# Patient Record
Sex: MALE | Race: WHITE | NOT HISPANIC OR LATINO | Employment: FULL TIME | URBAN - METROPOLITAN AREA
[De-identification: names, ages, dates, MRNs, and addresses within clinical notes are randomized per-mention and may not be internally consistent; named-entity substitution may affect disease eponyms.]

---

## 2021-07-14 PROCEDURE — 96372 THER/PROPH/DIAG INJ SC/IM: CPT

## 2021-07-14 PROCEDURE — 99284 EMERGENCY DEPT VISIT MOD MDM: CPT

## 2021-07-15 ENCOUNTER — HOSPITAL ENCOUNTER (EMERGENCY)
Facility: MEDICAL CENTER | Age: 31
End: 2021-07-15
Attending: EMERGENCY MEDICINE
Payer: COMMERCIAL

## 2021-07-15 ENCOUNTER — APPOINTMENT (OUTPATIENT)
Dept: RADIOLOGY | Facility: MEDICAL CENTER | Age: 31
End: 2021-07-15
Attending: EMERGENCY MEDICINE
Payer: COMMERCIAL

## 2021-07-15 VITALS
DIASTOLIC BLOOD PRESSURE: 61 MMHG | RESPIRATION RATE: 14 BRPM | TEMPERATURE: 98.5 F | SYSTOLIC BLOOD PRESSURE: 124 MMHG | HEART RATE: 50 BPM | OXYGEN SATURATION: 97 %

## 2021-07-15 DIAGNOSIS — S16.1XXA STRAIN OF NECK MUSCLE, INITIAL ENCOUNTER: ICD-10-CM
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
## 2021-07-15 PROCEDURE — 72125 CT NECK SPINE W/O DYE: CPT

## 2021-07-15 PROCEDURE — 700102 HCHG RX REV CODE 250 W/ 637 OVERRIDE(OP): Performed by: EMERGENCY MEDICINE

## 2021-07-15 PROCEDURE — 700111 HCHG RX REV CODE 636 W/ 250 OVERRIDE (IP): Performed by: EMERGENCY MEDICINE

## 2021-07-15 PROCEDURE — 72040 X-RAY EXAM NECK SPINE 2-3 VW: CPT

## 2021-07-15 PROCEDURE — A9270 NON-COVERED ITEM OR SERVICE: HCPCS | Performed by: EMERGENCY MEDICINE

## 2021-07-15 RX ORDER — DIAZEPAM 5 MG/1
5 TABLET ORAL ONCE
Status: COMPLETED | OUTPATIENT
Start: 2021-07-15 | End: 2021-07-15

## 2021-07-15 RX ORDER — CYCLOBENZAPRINE HCL 10 MG
10 TABLET ORAL 3 TIMES DAILY PRN
Qty: 30 TABLET | Refills: 0 | Status: SHIPPED | OUTPATIENT
Start: 2021-07-15 | End: 2021-09-09

## 2021-07-15 RX ORDER — KETOROLAC TROMETHAMINE 30 MG/ML
30 INJECTION, SOLUTION INTRAMUSCULAR; INTRAVENOUS ONCE
Status: COMPLETED | OUTPATIENT
Start: 2021-07-15 | End: 2021-07-15

## 2021-07-15 RX ORDER — NAPROXEN 500 MG/1
500 TABLET ORAL 2 TIMES DAILY WITH MEALS
Qty: 60 TABLET | Refills: 0 | Status: SHIPPED | OUTPATIENT
Start: 2021-07-15 | End: 2021-09-09

## 2021-07-15 RX ADMIN — DIAZEPAM 5 MG: 5 TABLET ORAL at 01:51

## 2021-07-15 RX ADMIN — KETOROLAC TROMETHAMINE 30 MG: 30 INJECTION, SOLUTION INTRAMUSCULAR at 01:51

## 2021-07-15 ASSESSMENT — ENCOUNTER SYMPTOMS
HEADACHES: 1
NECK PAIN: 1
BACK PAIN: 1
VOMITING: 1
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
## 2021-07-15 NOTE — ED NOTES
Pt discharged home. Explained discharge and medication instructions. Questions and comments addressed. Pt verbalized understanding of instructions. Pt advised to follow-up with PCP or return to ED for any new or worsening of symptoms. Pt is ambulating well and steady on feet. VS stable. Pt's SO at bedside and will be driving pt home.

## 2021-09-09 ENCOUNTER — HOSPITAL ENCOUNTER (OUTPATIENT)
Dept: RADIOLOGY | Facility: MEDICAL CENTER | Age: 31
End: 2021-09-09
Attending: NURSE PRACTITIONER
Payer: COMMERCIAL

## 2021-09-09 ENCOUNTER — OFFICE VISIT (OUTPATIENT)
Dept: URGENT CARE | Facility: PHYSICIAN GROUP | Age: 31
End: 2021-09-09
Payer: COMMERCIAL

## 2021-09-09 VITALS
BODY MASS INDEX: 33.79 KG/M2 | WEIGHT: 236 LBS | OXYGEN SATURATION: 96 % | SYSTOLIC BLOOD PRESSURE: 108 MMHG | TEMPERATURE: 98.4 F | DIASTOLIC BLOOD PRESSURE: 64 MMHG | HEIGHT: 70 IN | RESPIRATION RATE: 17 BRPM | HEART RATE: 88 BPM

## 2021-09-09 DIAGNOSIS — S93.601A SPRAIN OF RIGHT FOOT, INITIAL ENCOUNTER: ICD-10-CM

## 2021-09-09 DIAGNOSIS — M79.671 RIGHT FOOT PAIN: ICD-10-CM

## 2021-09-09 PROCEDURE — 99203 OFFICE O/P NEW LOW 30 MIN: CPT | Performed by: NURSE PRACTITIONER

## 2021-09-09 PROCEDURE — 73630 X-RAY EXAM OF FOOT: CPT | Mod: RT

## 2021-09-09 ASSESSMENT — ENCOUNTER SYMPTOMS
CHILLS: 0
MYALGIAS: 1
FEVER: 0
FOCAL WEAKNESS: 0
SENSORY CHANGE: 0

## 2021-09-09 NOTE — PROGRESS NOTES
Subjective     Nick Kunz is a 30 y.o. male who presents with Foot Injury (R foot fell from trying to jump a wall , can't put pressure on right leg bc of the pain)            HPI   New problem.  Patient is a 30-year-old male who presents with right foot injury after a fall trying to jump a wall.  He has pain to the medial aspect just inferior to the medial malleolus.  He cannot bear weight on it without significant pain.  He denies distal paresthesia or focal weakness.  He has been icing and wearing a foot brace since the injury however no improvement in symptoms.  Patient has no known allergies.  No current outpatient medications on file prior to visit.     No current facility-administered medications on file prior to visit.     Social History     Socioeconomic History   • Marital status: Single     Spouse name: Not on file   • Number of children: Not on file   • Years of education: Not on file   • Highest education level: Not on file   Occupational History   • Not on file   Tobacco Use   • Smoking status: Never Smoker   • Smokeless tobacco: Never Used   Substance and Sexual Activity   • Alcohol use: No   • Drug use: No   • Sexual activity: Not on file   Other Topics Concern   • Not on file   Social History Narrative   • Not on file     Social Determinants of Health     Financial Resource Strain:    • Difficulty of Paying Living Expenses:    Food Insecurity:    • Worried About Running Out of Food in the Last Year:    • Ran Out of Food in the Last Year:    Transportation Needs:    • Lack of Transportation (Medical):    • Lack of Transportation (Non-Medical):    Physical Activity:    • Days of Exercise per Week:    • Minutes of Exercise per Session:    Stress:    • Feeling of Stress :    Social Connections:    • Frequency of Communication with Friends and Family:    • Frequency of Social Gatherings with Friends and Family:    • Attends Voodoo Services:    • Active Member of Clubs or Organizations:    • Attends  "Club or Organization Meetings:    • Marital Status:    Intimate Partner Violence:    • Fear of Current or Ex-Partner:    • Emotionally Abused:    • Physically Abused:    • Sexually Abused:      Breast Cancer-related family history is not on file.      Review of Systems   Constitutional: Negative for chills and fever.   Musculoskeletal: Positive for joint pain and myalgias.   Neurological: Negative for sensory change and focal weakness.              Objective     /64 (BP Location: Left arm, Patient Position: Sitting, BP Cuff Size: Large adult)   Pulse 88   Temp 36.9 °C (98.4 °F) (Temporal)   Resp 17   Ht 1.778 m (5' 10\")   Wt 107 kg (236 lb)   SpO2 96%   BMI 33.86 kg/m²      Physical Exam  Vitals and nursing note reviewed.   Constitutional:       Appearance: Normal appearance.   Musculoskeletal:      Right foot: Decreased range of motion. Normal capillary refill. Tenderness and bony tenderness present. No swelling.   Skin:     General: Skin is warm and dry.      Coloration: Skin is not pale.      Findings: No bruising or erythema.   Neurological:      General: No focal deficit present.      Mental Status: He is alert and oriented to person, place, and time.   Psychiatric:         Mood and Affect: Mood normal.                             Assessment & Plan        1. Sprain of right foot, initial encounter     2. Right foot pain  DX-FOOT-COMPLETE 3+ RIGHT     X-ray negative for fracture or dislocation.  Patient is advised on the rice protocol which she has been following.  I have given him a work note for the next 2 days and he has the weekend off to rest and recover.  Follow-up 7 to 10 days if symptoms or not improving.           "

## 2021-10-28 ENCOUNTER — HOSPITAL ENCOUNTER (OUTPATIENT)
Facility: MEDICAL CENTER | Age: 31
End: 2021-10-28
Attending: PHYSICIAN ASSISTANT
Payer: COMMERCIAL

## 2021-10-28 ENCOUNTER — OFFICE VISIT (OUTPATIENT)
Dept: URGENT CARE | Facility: PHYSICIAN GROUP | Age: 31
End: 2021-10-28
Payer: COMMERCIAL

## 2021-10-28 VITALS
HEIGHT: 70 IN | OXYGEN SATURATION: 99 % | BODY MASS INDEX: 32.93 KG/M2 | DIASTOLIC BLOOD PRESSURE: 84 MMHG | RESPIRATION RATE: 16 BRPM | SYSTOLIC BLOOD PRESSURE: 140 MMHG | HEART RATE: 72 BPM | TEMPERATURE: 99.7 F | WEIGHT: 230 LBS

## 2021-10-28 DIAGNOSIS — R11.2 NON-INTRACTABLE VOMITING WITH NAUSEA, UNSPECIFIED VOMITING TYPE: ICD-10-CM

## 2021-10-28 DIAGNOSIS — R05.9 COUGH: ICD-10-CM

## 2021-10-28 LAB
EXTERNAL QUALITY CONTROL: NORMAL
SARS-COV+SARS-COV-2 AG RESP QL IA.RAPID: NEGATIVE

## 2021-10-28 PROCEDURE — 87426 SARSCOV CORONAVIRUS AG IA: CPT | Performed by: PHYSICIAN ASSISTANT

## 2021-10-28 PROCEDURE — U0003 INFECTIOUS AGENT DETECTION BY NUCLEIC ACID (DNA OR RNA); SEVERE ACUTE RESPIRATORY SYNDROME CORONAVIRUS 2 (SARS-COV-2) (CORONAVIRUS DISEASE [COVID-19]), AMPLIFIED PROBE TECHNIQUE, MAKING USE OF HIGH THROUGHPUT TECHNOLOGIES AS DESCRIBED BY CMS-2020-01-R: HCPCS

## 2021-10-28 PROCEDURE — 99214 OFFICE O/P EST MOD 30 MIN: CPT | Performed by: PHYSICIAN ASSISTANT

## 2021-10-28 PROCEDURE — U0005 INFEC AGEN DETEC AMPLI PROBE: HCPCS

## 2021-10-28 ASSESSMENT — ENCOUNTER SYMPTOMS
NAUSEA: 1
COUGH: 1
VOMITING: 1
DIARRHEA: 1
CHILLS: 0
FEVER: 0

## 2021-10-28 NOTE — LETTER
October 28, 2021         Patient: Nikc Kunz   YOB: 1990   Date of Visit: 10/28/2021           To Whom it May Concern:    Nick Kunz was seen in my clinic on 10/28/2021.  Please excuse patient's recent absence due to illness which started earlier this week.  He should be excused from work until his Covid results come back.    If you have any questions or concerns, please don't hesitate to call.        Sincerely,           Tavo Kunz P.A.-C.  Electronically Signed

## 2021-10-28 NOTE — PROGRESS NOTES
"  Subjective:   Nick Kunz is a 30 y.o. male who presents today with   Chief Complaint   Patient presents with   • Other     2nd covid vaccine on monday, been sick since     Other  This is a new problem. Associated symptoms include coughing, nausea and vomiting. Pertinent negatives include no chills or fever. He has tried nothing for the symptoms. The treatment provided no relief.     Patient had his first dose of Pfizer vaccine on October 2 and his second dose 3 days ago on October 25.  Patient states approximately 24 hours after his vaccination on Monday he was having some cough and nausea and vomiting.  Patient states he is still able to eat and drink.  PMH:  has no past medical history on file.  MEDS: No current outpatient medications on file.  ALLERGIES: No Known Allergies  SURGHX:   Past Surgical History:   Procedure Laterality Date   • OTHER ORTHOPEDIC SURGERY       SOCHX:  reports that he has never smoked. He has never used smokeless tobacco. He reports that he does not drink alcohol and does not use drugs.  FH: Reviewed with patient, not pertinent to this visit.     Review of Systems   Constitutional: Negative for chills and fever.   Respiratory: Positive for cough.    Gastrointestinal: Positive for diarrhea (Mild), nausea and vomiting.      Objective:   /84   Pulse 72   Temp 37.6 °C (99.7 °F)   Resp 16   Ht 1.778 m (5' 10\")   Wt 104 kg (230 lb)   SpO2 99%   BMI 33.00 kg/m²   Physical Exam  Vitals and nursing note reviewed.   Constitutional:       General: He is not in acute distress.     Appearance: Normal appearance. He is well-developed. He is not ill-appearing or toxic-appearing.   HENT:      Head: Normocephalic and atraumatic.      Right Ear: Hearing normal.      Left Ear: Hearing normal.      Mouth/Throat:      Pharynx: No oropharyngeal exudate or posterior oropharyngeal erythema.   Eyes:      Conjunctiva/sclera: Conjunctivae normal.   Cardiovascular:      Rate and Rhythm: Normal rate " and regular rhythm.      Heart sounds: Normal heart sounds.   Pulmonary:      Effort: Pulmonary effort is normal.      Breath sounds: Normal breath sounds. No stridor. No wheezing, rhonchi or rales.   Abdominal:      General: Bowel sounds are normal. There is no distension.      Tenderness: There is no abdominal tenderness. There is no guarding.   Musculoskeletal:      Comments: Normal movement in all 4 extremities   Skin:     General: Skin is warm and dry.   Neurological:      Mental Status: He is alert.      Coordination: Coordination normal.   Psychiatric:         Mood and Affect: Mood normal.     COVID POCT NEG  Assessment/Plan:   Assessment    1. Cough  - POCT SARS-COV Antigen FRANCOIS (Symptomatic Only)  - SARS-CoV-2 PCR (24 hour In-House): Collect NP swab in VTM; Future    2. Non-intractable vomiting with nausea, unspecified vomiting type  - POCT SARS-COV Antigen FRANCOIS (Symptomatic Only)  - SARS-CoV-2 PCR (24 hour In-House): Collect NP swab in VTM; Future  Symptoms are most consistent with viral illness.  Believe this may have been developing prior to him having the vaccine.  Would not suspect Covid vaccine related symptoms to go on for 3 days.  We will rule out Covid at this time.  No signs of angioedema or reaction on exam today.  Discussed CDC guidelines including self isolation at home.   Patient encouraged to get plenty of rest, use OTC tylenol for pain/fever, and drink plenty of fluids.    Differential diagnosis, natural history, supportive care, and indications for immediate follow-up discussed.   Patient given instructions and understanding of medications and treatment.    If not improving in 3-5 days, F/U with PCP or return to  if symptoms worsen.    Patient agreeable to plan.  Greater than 30 minutes were spent reviewing patient's chart, examining and obtaining history from patient, and discussing plan of care.       Please note that this dictation was created using voice recognition software. I have  made every reasonable attempt to correct obvious errors, but I expect that there are errors of grammar and possibly content that I did not discover before finalizing the note.    Tavo Kunz PA-C

## 2021-10-29 DIAGNOSIS — R05.9 COUGH: ICD-10-CM

## 2021-10-29 DIAGNOSIS — R11.2 NON-INTRACTABLE VOMITING WITH NAUSEA, UNSPECIFIED VOMITING TYPE: ICD-10-CM

## 2021-10-29 LAB
COVID ORDER STATUS COVID19: NORMAL
SARS-COV-2 RNA RESP QL NAA+PROBE: NOTDETECTED
SPECIMEN SOURCE: NORMAL

## 2021-11-02 ENCOUNTER — TELEPHONE (OUTPATIENT)
Dept: URGENT CARE | Facility: PHYSICIAN GROUP | Age: 31
End: 2021-11-02

## 2021-11-02 NOTE — TELEPHONE ENCOUNTER
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

## 2022-02-12 ENCOUNTER — OCCUPATIONAL MEDICINE (OUTPATIENT)
Dept: URGENT CARE | Facility: CLINIC | Age: 32
End: 2022-02-12
Payer: COMMERCIAL

## 2022-02-12 VITALS
WEIGHT: 244 LBS | RESPIRATION RATE: 16 BRPM | HEART RATE: 80 BPM | DIASTOLIC BLOOD PRESSURE: 70 MMHG | HEIGHT: 70 IN | SYSTOLIC BLOOD PRESSURE: 116 MMHG | BODY MASS INDEX: 34.93 KG/M2 | OXYGEN SATURATION: 96 % | TEMPERATURE: 97.3 F

## 2022-02-12 DIAGNOSIS — L03.011 PARONYCHIA OF FINGER OF RIGHT HAND: ICD-10-CM

## 2022-02-12 PROCEDURE — 99213 OFFICE O/P EST LOW 20 MIN: CPT | Performed by: FAMILY MEDICINE

## 2022-02-12 RX ORDER — SULFAMETHOXAZOLE AND TRIMETHOPRIM 800; 160 MG/1; MG/1
1 TABLET ORAL 2 TIMES DAILY
Qty: 14 TABLET | Refills: 0 | Status: SHIPPED | OUTPATIENT
Start: 2022-02-12 | End: 2022-02-19

## 2022-02-12 NOTE — LETTER
[facility] [address] [place] [address]  Phone:  [phone] - Fax:  [phone]   [facility] Progress Report and Disability Certification  Date of Service: 2/12/2022   No Show:  No  Date / Time of Next Visit:  2/17/22 4:00 PM   Claim Information   Patient Name: Nick Kunz  Claim Number:     Employer:   [employer] Date of Injury: 2/7/2022     Insurer / TPA: [employer]  ID / SSN:     Occupation:   Diagnosis: The encounter diagnosis was Paronychia of finger of right hand.    Medical Information   Related to Industrial Injury? Yes    Subjective Complaints:  DOI:  2/7    Pt sustained small abrasion to rt index finger at work in course of pulling down box.       Pt c/o constant, throbbing pain at  Right finger Finger.         Area is tender to the touch.     Denies fever   Objective Findings:   Rt 2nd digit - erythema and edema at lateral aspect of nail bed.  The sensation was intact throughout to light touch.   Wrist and finger range of motion was normal.  Strength testing demonstrated normal wrist flexion and extension.   Point tenderness was localized to the distal  digit.      Pre-Existing Condition(s):     Assessment:   Initial Visit    Status: Additional Care Required  Permanent Disability:No    Plan:      Diagnostics:      Comments:       Disability Information   Status: Released to Full Duty    From:   2/12/22  Through:   2/17/22 Restrictions are:     Physical Restrictions   Sitting:    Standing:    Stooping:    Bending:      Squatting:    Walking:    Climbing:    Pushing:      Pulling:    Other:    Reaching Above Shoulder (L):   Reaching Above Shoulder (R):       Reaching Below Shoulder (L):    Reaching Below Shoulder (R):      Not to exceed Weight Limits   Carrying(hrs):   Weight Limit(lb):   Lifting(hrs):   Weight  Limit(lb):     Comments: 1. Paronychia, rt index finger     Full duty    Follow up in 3-5 d      -  sulfamethoxazole-trimethoprim (BACTRIM DS) 800-160 MG tablet; Take 1 Tab by mouth 2 times a day for 7 days.  Dispense: 14 Tab; Refill: 0      Repetitive Actions   Hands: i.e. Fine Manipulations from Grasping:     Feet: i.e. Operating Foot Controls:     Driving / Operate Machinery:     Health Care Provider’s Original or Electronic Signature  Ander Chavez M.D. Health Care Provider’s Original or Electronic Signature    Gerard Tavarez MD         Clinic Name / Location: [address] [place] [address] Clinic Phone Number: Dept: [phone]   Appointment Time: 5:45 Pm Visit Start Time: 6:22 PM   Check-In Time:  6:09 Pm Visit Discharge Time:     Original-Treating Physician or Chiropractor    Page 2-Insurer/TPA    Page 3-Employer    Page 4-Employee

## 2022-02-12 NOTE — LETTER
"EMPLOYEE’S CLAIM FOR COMPENSATION/ REPORT OF INITIAL TREATMENT  FORM C-4    EMPLOYEE’S CLAIM - PROVIDE ALL INFORMATION REQUESTED   First Name  Nick Last Name  Ze Birthdate                    1990                Sex  male Claim Number (Insurer’s Use Only)    Home Address  [address]  Age  31 y.o. Height  1.778 m (5' 10\") Weight  111 kg (244 lb) [facility] Zip  [address] Telephone  [phone] (home)    Mailing Address  [address]  [facility] Zip  [address] Primary Language Spoken  English    Insurer   Third-Party   Misc Workers Comp   Employee's Occupation (Job Title) When Injury or Occupational Disease Occurred      Employer's Name/Company Name    [employer] Telephone  [phone]    Office Mail Address (Number and Street)   [address] [facility]  Zip  [address]    Date of Injury  2/7/2022               Hours Injury  1:30 PM Date Employer Notified  2/8/2022 Last Day of Work after Injury     or Occupational Disease  2/10/2022 Supervisor to Whom Injury     Reported  Hieu   Address or Location of Accident (if applicable)  [[address] [place], [place]]   What were you doing at the time of accident? (if applicable)  Loading boxes of truck parts onto a pallet    How did this injury or occupational disease occur? (Be specific an answer in detail. Use additional sheet if necessary)  Reaching onto a top shelf I grabbed a box approx 50-70 lbs and pulled it down and it hit my finger and caused a lot swelling. The swelling got worse and the infection spread across my finger   If you believe that you have an occupational disease, when did you first have knowledge of the disability and it relationship to your employment?  N/A Witnesses to the Accident  None      Nature of Injury or Occupational Disease  Contusion  Part(s) of Body Injured or Affected  Finger " (R), ,     I certify that the above is true and correct to the best of my knowledge and that I have provided this information in order to obtain the benefits of [place] or [place] 617 of [place]).  I hereby authorize any physician, chiropractor, surgeon, practitioner, or other person, any hospital, including [facility] or [facility], any medical service organization, any insurance company, or other institution or organization to release to each other, any medical or other information, including benefits paid or payable, pertinent to this injury or disease, except information relative to diagnosis, treatment and/or counseling for AIDS, psychological conditions, alcohol or controlled substances, for which I must give specific authorization.  A Photostat of this authorization shall be as valid as the original.     Date   Place Employee’s Original or  *Electronic Signature   THIS REPORT MUST BE COMPLETED AND MAILED WITHIN 3 WORKING DAYS OF TREATMENT   Place  [facility]  [facility] of Facility  [facility]   Date  2/12/2022 Diagnosis and Description of Injury or Occupational Disease  (L03.011) Paronychia of finger of right hand Is there evidence the injured employee was under the influence of alcohol and/or another controlled substance at the time of accident?  ? No ? Yes (if yes, please explain)    Hour  6:22 PM   The encounter diagnosis was Paronychia of finger of right hand. No   Treatment  1. Paronychia, rt      Full duty    Follow up in 3-5 d      - sulfamethoxazole-trimethoprim (BACTRIM DS) 800-160 MG tablet; Take 1 Tab by mouth 2 times a day for 7 days.  Dispense: 14 Tab; Refill: 0    Have you advised the patient to remain off work five days or     more?    X-Ray Findings      ? Yes Indicate dates:   From   To      From information given by the employee, together with medical evidence, can        you directly  "connect this injury or occupational disease as job incurred?  Yes ? No If no, is the injured employee capable of:  ? full duty  Yes ? modified duty      Is additional medical care by a physician indicated?  Yes If Modified Duty, Specify any Limitations / Restrictions      Do you know of any previous injury or disease contributing to this condition or occupational disease?  ? Yes ? No (Explain if yes)                          No   Date  2/12/2022 Print Health Care Provider's   Ander Chavez M.D. I certify the employer’s copy of  this form was mailed on:   Address  [address] Insurer’s Use Only     [facility] Zip  [address]    Provider’s Tax ID Number  [id number] Telephone  Dept: [phone]             Health Care Provider’s Original or Electronic Signature  e-ANDER Hinton M.D. Degree (MD,DO, DC,PAYvetteC,APRN)   MD      * Complete and attach Release of Information (Form C-4A) when injured employee signs C-4 Form electronically  ORIGINAL - TREATING HEALTHCARE PROVIDER PAGE 2 - INSURER/TPA PAGE 3 - EMPLOYER PAGE 4 - EMPLOYEE             Form C-4 (rev.08/21)           BRIEF DESCRIPTION OF RIGHTS AND BENEFITS  (Pursuant to [place] 616C.050)    Notice of Injury or Occupational Disease (Incident Report Form C-1): If an injury or occupational disease (OD) arises out of and in the course of employment, you must provide written notice to your employer as soon as practicable, but no later than 7 days after the accident or OD. Your employer shall maintain a sufficient supply of the required forms.    Claim for Compensation (Form C-4): If medical treatment is sought, the form C-4 is available at the place of initial treatment. A completed \"Claim for Compensation\" (Form C-4) must be filed within 90 days after an accident or OD. The treating physician or chiropractor must, within 3 working days after treatment, complete and mail to the employer, the employer's insurer and third-party , the " Claim for Compensation.    Medical Treatment: If you require medical treatment for your on-the-job injury or OD, you may be required to select a physician or chiropractor from a list provided by your workers’ compensation insurer, if it has contracted with an Organization for Managed Care (MCO) or Preferred Provider Organization (PPO) or providers of health care. If your employer has not entered into a contract with an MCO or PPO, you may select a physician or chiropractor from the Panel of Physicians and Chiropractors. Any medical costs related to your industrial injury or OD will be paid by your insurer.    Temporary Total Disability (TTD): If your doctor has certified that you are unable to work for a period of at least 5 consecutive days, or 5 cumulative days in a 20-day period, or places restrictions on you that your employer does not accommodate, you may be entitled to TTD compensation.    Temporary Partial Disability (TPD): If the wage you receive upon reemployment is less than the compensation for TTD to which you are entitled, the insurer may be required to pay you TPD compensation to make up the difference. TPD can only be paid for a maximum of 24 months.    Permanent Partial Disability (PPD): When your medical condition is stable and there is an indication of a PPD as a result of your injury or OD, within 30 days, your insurer must arrange for an evaluation by a rating physician or chiropractor to determine the degree of your PPD. The amount of your PPD award depends on the date of injury, the results of the PPD evaluation, your age and wage.    Permanent Total Disability (PTD): If you are medically certified by a treating physician or chiropractor as permanently and totally disabled and have been granted a PTD status by your insurer, you are entitled to receive monthly benefits not to exceed 66 2/3% of your average monthly wage. The amount of your PTD payments is subject to reduction if you previously  received a lump-sum PPD award.    Vocational Rehabilitation Services: You may be eligible for vocational rehabilitation services if you are unable to return to the job due to a permanent physical impairment or permanent restrictions as a result of your injury or occupational disease.    Transportation and Per Lucio Reimbursement: You may be eligible for travel expenses and per lucio associated with medical treatment.    Reopening: You may be able to reopen your claim if your condition worsens after claim closure.     Appeal Process: If you disagree with a written determination issued by the insurer or the insurer does not respond to your request, you may appeal to the Department of Administration, , by following the instructions contained in your determination letter. You must appeal the determination within 70 days from the date of the determination letter at [address], [address], [place], [place] [address], or [address], [address], [place] [address]. If you disagree with the  decision, you may appeal to the Department of Administration, . You must file your appeal within 30 days from the date of the  decision letter at [address], [address], [place], [place] [address], or [address], [address], [place], [place] [address]. If you disagree with a decision of an , you may file a petition for judicial review with the District Court. You must do so within 30 days of the Appeal Officer’s decision. You may be represented by an  at your own expense or you may contact the [facility] for possible representation.    [place]  for Injured Workers (NAIW): If you disagree with a  decision, you may request that NAIW represent you without charge at an  Hearing. For information regarding denial of benefits, you may contact the [facility] at: [address], [address],  [place] [address], [phone], or [address], [address], [place] [address], [phone]    To File a Complaint with the Division: If you wish to file a complaint with the  of the Division of Industrial Relations (DIR),  please contact the Workers’ Compensation Section, [address], [address], [place], [place] [address], telephone [phone], or [address], [address], [place], [place] [address], telephone [phone].    For assistance with Workers’ Compensation Issues: You may contact the [place] for Consumer Health Assistance, [address], [address] [facility] [address], [place] [address], Toll Free 1-327.120.2097, Web site: http://[facility].nv.[facility]/Programs/NILS E-mail: [email or web address].[email or web address].[email or web address]              __________________________________________________________________                                    _________________            Employee Name / Signature                                                                                                                            Date                                                                                                                                                                                                                              D-2 (rev. 10/20)

## 2022-02-13 NOTE — PROGRESS NOTES
"Chief Complaint   Patient presents with   • Finger Injury     swelling, right index x 6 days          HISTORY OF PRESENT ILLNESS:        DOI:  2/7    Pt sustained small abrasion to rt index finger at work in course of pulling down box.       Pt c/o constant, throbbing pain at  Right index Finger.         Area is tender to the touch.     Denies fever      No past medical history on file.      Social History     Tobacco Use   • Smoking status: Never Smoker   • Smokeless tobacco: Never Used   Substance Use Topics   • Alcohol use: No   • Drug use: No         No family history on file.    OBJECTIVE  /70   Pulse 80   Temp 36.3 °C (97.3 °F) (Temporal)   Resp 16   Ht 1.778 m (5' 10\")   Wt 111 kg (244 lb)   SpO2 96%        PHYSICAL EXAMINATION:   The patient is alert, oriented, interactive, and in no acute distress.  Pt is completely appropriate with the history and physical examination. Pt has a normal gait about the examination room. Regarding the  hand, it was well profused.      Rt 2nd digit - erythema and edema at lateral aspect of nail bed.  The sensation was intact throughout to light touch.   Wrist and finger range of motion was normal.  Strength testing demonstrated normal wrist flexion and extension.   Point tenderness was localized to the distal  digit.             IMPRESSION/ PLAN:        1. Paronychia, rt      Full duty    Follow up in 3-5 d      - sulfamethoxazole-trimethoprim (BACTRIM DS) 800-160 MG tablet; Take 1 Tab by mouth 2 times a day for 7 days.  Dispense: 14 Tab; Refill: 0         "

## 2022-02-17 ENCOUNTER — OCCUPATIONAL MEDICINE (OUTPATIENT)
Dept: URGENT CARE | Facility: CLINIC | Age: 32
End: 2022-02-17
Payer: COMMERCIAL

## 2022-02-17 VITALS
WEIGHT: 243.8 LBS | HEIGHT: 70 IN | DIASTOLIC BLOOD PRESSURE: 64 MMHG | SYSTOLIC BLOOD PRESSURE: 112 MMHG | HEART RATE: 64 BPM | RESPIRATION RATE: 20 BRPM | OXYGEN SATURATION: 95 % | BODY MASS INDEX: 34.9 KG/M2 | TEMPERATURE: 98.3 F

## 2022-02-17 DIAGNOSIS — L03.011 PARONYCHIA OF FINGER OF RIGHT HAND: ICD-10-CM

## 2022-02-17 PROCEDURE — 99213 OFFICE O/P EST LOW 20 MIN: CPT | Performed by: PHYSICIAN ASSISTANT

## 2022-02-17 NOTE — LETTER
[facility] [address] [address]  Phone:  [phone] - Fax:  [phone]   [facility] Progress Report and Disability Certification  Date of Service: 2/17/2022   No Show:  No  Date / Time of Next Visit: 2/23/2022 9:15 AM   Claim Information   Patient Name: Nick Kunz  Claim Number:     Employer:   [employer] Date of Injury: 2/7/2022     Insurer / TPA: Primo Walton  ID / SSN:     Occupation:   Diagnosis: The encounter diagnosis was Paronychia of finger of right hand.    Medical Information   Related to Industrial Injury? Yes    Subjective Complaints:  DOI: 2/7/22 -patient noted abrasion to finger on right hand that occurred while reaching a box and pulling down from overhead.  Noted swelling warmth and redness.  Presented to urgent care and treated with Bactrim.  Patient returns to clinic now 5 days later stating: He did drain area of infection on his own 2 days ago.  He notes significant improvement in warmth redness and swelling thereafter.  He denies fevers chills.  He has not trialed full duty at work yet because finger was worse before it more recently became somewhat better.  He would like to trial full duty prior to closing case.   Objective Findings: Gen: AOx3; Head: NC AT; Eyes: PERRLA/EOM; Lungs: NLR; Cardiac: RR by periph pulse exam; right index: Focal erythema adjacent to proximal nail fold, nonfluctuant, extends down to DIPJ on dorsum, skin appears intact, nonpointing; neuro: N VID, normal sensation to light touch, brisk capillary refill throughout   Pre-Existing Condition(s):     Assessment:   Condition Improved    Status: Additional Care Required  Permanent Disability:No    Plan:   Comments:Full duty trial, salt water soaks, follow-up in 6 days for likely MMI or sooner with problems      Diagnostics:      Comments:  Full duty trial, salt water soaks, follow-up in 6 days for likely MMI or sooner with  problems     Disability Information   Status: Released to Full Duty    From:  2/17/2022  Through: 2/23/2022 Restrictions are:     Physical Restrictions   Sitting:    Standing:    Stooping:    Bending:      Squatting:    Walking:    Climbing:    Pushing:      Pulling:    Other:    Reaching Above Shoulder (L):   Reaching Above Shoulder (R):       Reaching Below Shoulder (L):    Reaching Below Shoulder (R):      Not to exceed Weight Limits   Carrying(hrs):   Weight Limit(lb):   Lifting(hrs):   Weight  Limit(lb):     Comments: Full duty trial, salt water soaks, follow-up in 6 days for likely MMI or sooner with problems     Repetitive Actions   Hands: i.e. Fine Manipulations from Grasping:     Feet: i.e. Operating Foot Controls:     Driving / Operate Machinery:     Health Care Provider’s Original or Electronic Signature  Forest Velázquez P.A.-C. Health Care Provider’s Original or Electronic Signature    Gerard Tavarez MD         Clinic Name / Location: [address] [place] [address] Clinic Phone Number: Dept: [phone]   Appointment Time: 4:00 Pm Visit Start Time: 3:03 PM   Check-In Time:  2:16 Pm Visit Discharge Time:     Original-Treating Physician or Chiropractor    Page 2-Insurer/TPA    Page 3-Employer    Page 4-Employee

## 2022-02-17 NOTE — PROGRESS NOTES
"Subjective:     Nick Kunz is a 31 y.o. male who presents for Other (WC FV DOI: 2/7/22 (R) finger has gotten better. )      DOI: 2/7/22 -patient noted abrasion to finger on right hand that occurred while reaching a box and pulling down from overhead.  Noted swelling warmth and redness.  Presented to urgent care and treated with Bactrim.  Patient returns to clinic now 5 days later stating: He did drain area of infection on his own 2 days ago.  He notes significant improvement in warmth redness and swelling thereafter.  He denies fevers chills.  He has not trialed full duty at work yet because finger was worse before it more recently became somewhat better.  He would like to trial full duty prior to closing case.    PMH:   No pertinent past medical history to this problem  MEDS:  Medications were reviewed in EMR  ALLERGIES:  Allergies were reviewed in EMR  FH:   No pertinent family history to this problem       Objective:     /64 (BP Location: Left arm, Patient Position: Sitting, BP Cuff Size: Large adult)   Pulse 64   Temp 36.8 °C (98.3 °F) (Temporal)   Resp 20   Ht 1.778 m (5' 10\")   Wt 111 kg (243 lb 12.8 oz)   SpO2 95%   BMI 34.98 kg/m²     Gen: AOx3; Head: NC AT; Eyes: PERRLA/EOM; Lungs: NLR; Cardiac: RR by periph pulse exam; right index: Focal erythema adjacent to proximal nail fold, nonfluctuant, extends down to DIPJ on dorsum, skin appears intact, nonpointing; neuro: N VID, normal sensation to light touch, brisk capillary refill throughout    Assessment/Plan:       1. Paronychia of finger of right hand    • Released to Full Duty FROM 2/17/2022 TO 2/23/2022  • Full duty trial, salt water soaks, follow-up in 6 days for likely MMI or sooner with problems   • Full duty trial, salt water soaks, follow-up in 6 days for likely MMI or sooner with problems     Differential diagnosis, natural history, supportive care, and indications for immediate follow-up discussed.  "

## 2022-02-23 ENCOUNTER — OCCUPATIONAL MEDICINE (OUTPATIENT)
Dept: URGENT CARE | Facility: CLINIC | Age: 32
End: 2022-02-23
Payer: COMMERCIAL

## 2022-02-23 VITALS
RESPIRATION RATE: 18 BRPM | HEART RATE: 81 BPM | WEIGHT: 243 LBS | BODY MASS INDEX: 34.79 KG/M2 | SYSTOLIC BLOOD PRESSURE: 124 MMHG | TEMPERATURE: 98.2 F | HEIGHT: 70 IN | OXYGEN SATURATION: 96 % | DIASTOLIC BLOOD PRESSURE: 70 MMHG

## 2022-02-23 DIAGNOSIS — L03.011 PARONYCHIA OF RIGHT INDEX FINGER: ICD-10-CM

## 2022-02-23 PROCEDURE — 99213 OFFICE O/P EST LOW 20 MIN: CPT | Performed by: PHYSICIAN ASSISTANT

## 2022-02-23 ASSESSMENT — ENCOUNTER SYMPTOMS
VOMITING: 0
FOCAL WEAKNESS: 0
NAUSEA: 0
CHILLS: 0
TINGLING: 0
FEVER: 0
SENSORY CHANGE: 0

## 2022-02-23 NOTE — PROGRESS NOTES
"Subjective     Nick Kunz is a 31 y.o. male who presents with No chief complaint on file.      DOI: 2/7/22. Patient is here for follow-up on post-traumatic paronychia of right index finger. He has finished 7 days of BactrimDS. He drained the infection on his own. He reports significant improvement. He was last seen on 2/17/2022 and released to full duty. He still has not returned to work. He feels that he is able to return to full duty. He denies fever or chills. He has minimal pain and full range of motion. No active drainage.      HPI      No past medical history on file.    Past Surgical History:   Procedure Laterality Date   • OTHER ORTHOPEDIC SURGERY         No family history on file.    No Known Allergies    Medications, Allergies, and current problem list reviewed today in Epic    Review of Systems   Constitutional: Negative for chills, fever and malaise/fatigue.   Gastrointestinal: Negative for nausea and vomiting.   Musculoskeletal: Negative for joint pain.   Neurological: Negative for tingling, sensory change and focal weakness.     All other systems reviewed and are negative.            Objective     /70   Pulse 81   Temp 36.8 °C (98.2 °F) (Temporal)   Resp 18   Ht 1.778 m (5' 10\")   Wt 110 kg (243 lb)   SpO2 96%   BMI 34.87 kg/m²      Physical Exam  Constitutional:       General: He is not in acute distress.     Appearance: He is not ill-appearing.   HENT:      Head: Normocephalic and atraumatic.   Eyes:      Conjunctiva/sclera: Conjunctivae normal.   Cardiovascular:      Rate and Rhythm: Normal rate and regular rhythm.   Pulmonary:      Effort: Pulmonary effort is normal. No respiratory distress.   Skin:     General: Skin is warm and dry.   Neurological:      General: No focal deficit present.      Mental Status: He is alert and oriented to person, place, and time.   Psychiatric:         Mood and Affect: Mood normal.         Behavior: Behavior normal.         Thought Content: Thought " content normal.         Judgment: Judgment normal.         Vitals reviewed.   Right index finger- FROM. Mild residual erythema around fingernail- no induration, edema, or fluctuance. NTTP. Distal n/v intact.                   Assessment & Plan        1. Paronychia of right index finger    Improving. Patient has yet to return back to work.  Trial of full duty  RTC in 5 days- anticipate Discharge/MMI    Differential diagnoses, Supportive care, and indications for immediate follow-up discussed with patient.   Pathogenesis of diagnosis discussed including typical length and natural progression.   Instructed to return to clinic or nearest emergency department for any change in condition, further concerns, or worsening of symptoms.    The patient demonstrated a good understanding and agreed with the treatment plan.    Virgen Sandoval P.A.-C.

## 2022-02-23 NOTE — LETTER
[facility] [address] [place] [address]  Phone:  [phone] - Fax:  [phone]   [facility] Progress Report and Disability Certification  Date of Service: 2/23/2022   No Show:  No  Date / Time of Next Visit: 2/28/2022 at 4PM   Claim Information   Patient Name: Nick Kunz  Claim Number:     Employer:    Date of Injury: 2/7/2022     Insurer / TPA: Primo Walton  ID / SSN:     Occupation:   Diagnosis: The encounter diagnosis was Paronychia of right index finger.    Medical Information   Related to Industrial Injury? Yes    Subjective Complaints:  DOI: 2/7/22. Patient is here for follow-up on post-traumatic paronychia of right index finger. He has finished 7 days of BactrimDS. He drained the infection on his own. He reports significant improvement. He was last seen on 2/17/2022 and released to full duty. He still has not returned to work. He feels that he is able to return to full duty. He denies fever or chills. He has minimal pain and full range of motion. No active drainage.    Objective Findings: Vitals reviewed.   Right index finger- FROM. Mild residual erythema around fingernail- no induration, edema, or fluctuance. NTTP. Distal n/v intact.   Pre-Existing Condition(s):     Assessment:   Condition Improved    Status: Additional Care Required  Permanent Disability:No    Plan:   Comments:trial of full duty- RTC and likely MMI/Discharge in 5 days    Diagnostics:      Comments:       Disability Information   Status: Released to Full Duty    From:  2/23/2022  Through: 2/28/2022 Restrictions are:     Physical Restrictions   Sitting:    Standing:    Stooping:    Bending:      Squatting:    Walking:    Climbing:    Pushing:      Pulling:    Other:    Reaching Above Shoulder (L):   Reaching Above Shoulder (R):       Reaching Below Shoulder (L):    Reaching Below Shoulder (R):      Not to exceed Weight Limits   Carrying(hrs):   Weight  Limit(lb):   Lifting(hrs):   Weight  Limit(lb):     Comments:      Repetitive Actions   Hands: i.e. Fine Manipulations from Grasping:     Feet: i.e. Operating Foot Controls:     Driving / Operate Machinery:     Health Care Provider’s Original or Electronic Signature  Virgen Sandoval PMorganAALEX. Health Care Provider’s Original or Electronic Signature    Gerard Tavarez MD         Clinic Name / Location: [address] [place] [address] Clinic Phone Number: Dept: [phone]   Appointment Time: 11:30 Am Visit Start Time: 11:42 AM   Check-In Time:  11:32 Am Visit Discharge Time:  12:20PM    Original-Treating Physician or Chiropractor    Page 2-Insurer/TPA    Page 3-Employer    Page 4-Employee

## 2022-05-27 ENCOUNTER — APPOINTMENT (OUTPATIENT)
Dept: RADIOLOGY | Facility: MEDICAL CENTER | Age: 32
End: 2022-05-27
Attending: PHYSICIAN ASSISTANT

## 2022-05-29 ENCOUNTER — HOSPITAL ENCOUNTER (OUTPATIENT)
Dept: RADIOLOGY | Facility: MEDICAL CENTER | Age: 32
End: 2022-05-29
Attending: PHYSICIAN ASSISTANT

## 2022-05-29 DIAGNOSIS — S83.200A BUCKET-HANDLE TEAR OF MENISCUS OF RIGHT KNEE AS CURRENT INJURY, UNSPECIFIED MENISCUS, INITIAL ENCOUNTER: ICD-10-CM

## 2022-05-29 PROCEDURE — 73721 MRI JNT OF LWR EXTRE W/O DYE: CPT | Mod: RT

## 2023-06-08 ENCOUNTER — OFFICE VISIT (OUTPATIENT)
Dept: URGENT CARE | Facility: CLINIC | Age: 33
End: 2023-06-08
Payer: COMMERCIAL

## 2023-06-08 VITALS
RESPIRATION RATE: 16 BRPM | HEART RATE: 94 BPM | DIASTOLIC BLOOD PRESSURE: 88 MMHG | TEMPERATURE: 97.3 F | OXYGEN SATURATION: 96 % | WEIGHT: 243.7 LBS | SYSTOLIC BLOOD PRESSURE: 140 MMHG | HEIGHT: 70 IN | BODY MASS INDEX: 34.89 KG/M2

## 2023-06-08 DIAGNOSIS — I77.6 VASCULITIS (HCC): ICD-10-CM

## 2023-06-08 PROCEDURE — 99214 OFFICE O/P EST MOD 30 MIN: CPT | Performed by: NURSE PRACTITIONER

## 2023-06-08 PROCEDURE — 3077F SYST BP >= 140 MM HG: CPT | Performed by: NURSE PRACTITIONER

## 2023-06-08 PROCEDURE — 3079F DIAST BP 80-89 MM HG: CPT | Performed by: NURSE PRACTITIONER

## 2023-06-08 ASSESSMENT — ENCOUNTER SYMPTOMS
FEVER: 0
CHILLS: 0

## 2023-06-08 NOTE — LETTER
June 8, 2023    To Whom It May Concern:         This is confirmation that Nick Kunz attended his scheduled appointment with SHANITA Matias on 6/08/23. Please excuse his absence due to an acute illness.          If you have any questions please do not hesitate to call me at the phone number listed below.    Sincerely,          RICHARD Matias.  [phone]

## 2023-06-09 ENCOUNTER — HOSPITAL ENCOUNTER (OUTPATIENT)
Dept: LAB | Facility: MEDICAL CENTER | Age: 33
End: 2023-06-09
Attending: NURSE PRACTITIONER
Payer: COMMERCIAL

## 2023-06-09 DIAGNOSIS — I77.6 VASCULITIS (HCC): ICD-10-CM

## 2023-06-09 LAB
CRP SERPL HS-MCNC: 2.55 MG/DL (ref 0–0.75)
ERYTHROCYTE [SEDIMENTATION RATE] IN BLOOD BY WESTERGREN METHOD: 18 MM/HOUR (ref 0–20)
RHEUMATOID FACT SER IA-ACNC: 10 IU/ML (ref 0–14)

## 2023-06-09 PROCEDURE — 87491 CHLMYD TRACH DNA AMP PROBE: CPT

## 2023-06-09 PROCEDURE — 85652 RBC SED RATE AUTOMATED: CPT

## 2023-06-09 PROCEDURE — 86431 RHEUMATOID FACTOR QUANT: CPT

## 2023-06-09 PROCEDURE — 87591 N.GONORRHOEAE DNA AMP PROB: CPT

## 2023-06-09 PROCEDURE — 86038 ANTINUCLEAR ANTIBODIES: CPT

## 2023-06-09 PROCEDURE — 86140 C-REACTIVE PROTEIN: CPT

## 2023-06-09 PROCEDURE — 36415 COLL VENOUS BLD VENIPUNCTURE: CPT

## 2023-06-09 RX ORDER — PREDNISONE 20 MG/1
TABLET ORAL
Qty: 18 TABLET | Refills: 0 | Status: SHIPPED | OUTPATIENT
Start: 2023-06-09

## 2023-06-09 NOTE — PROGRESS NOTES
Subjective:     Nick Kunz is a 32 y.o. male who presents for Rash (X1week Rash/muscle/body aches pain/)      Rash  Pertinent negatives include no fever.     Pt presents for evaluation of a new problem.  Nick is a very pleasant 32-year-old male presents to urgent care today with complaints of a rash over his entire body.  He states that this rash developed immediately after an illness that he was experiencing last week.  He did note fever sore throat and fatigue at the beginning of his illness.  His symptoms have completely resolved however, he is now left with a nonblanching rash over his arms, torso, back and lower extremities.  His face, hands and feet are spared.  He denies any pain associated with his rash.  He also developed left-sided hand swelling and pain which he believed to be cellulitis.  He was seen at [facility] emergency room on 6/5/2023 (3 days ago) where lab work was performed and found to be normal.  He was diagnosed with vasculitis and discharged home on a Medrol Dosepak.  He notes no improvement with the Medrol Dosepak he does endorse fairly severe joint pain that is worse in his lower extremities.  No recent fever, chills, nausea/vomiting or diarrhea.  His hand swelling has resolved since starting the Medrol Dosepak.  Review of Systems   Constitutional:  Negative for chills and fever.   Skin:  Positive for itching and rash.       PMH: No past medical history on file.  ALLERGIES: No Known Allergies  SURGHX:   Past Surgical History:   Procedure Laterality Date    OTHER ORTHOPEDIC SURGERY       SOCHX:   Social History     Socioeconomic History    Marital status: Single   Tobacco Use    Smoking status: Never    Smokeless tobacco: Never   Vaping Use    Vaping Use: Never used   Substance and Sexual Activity    Alcohol use: No    Drug use: No     FH: No family history on file.      Objective:   BP (!) 140/88 (BP Location: Left arm, Patient Position: Sitting)   Pulse 94   Temp  "36.3 °C (97.3 °F) (Temporal)   Resp 16   Ht 1.778 m (5' 10\")   Wt 111 kg (243 lb 11.2 oz)   SpO2 96%   BMI 34.97 kg/m²     Physical Exam  Vitals and nursing note reviewed.   Constitutional:       General: He is not in acute distress.     Appearance: Normal appearance. He is normal weight. He is not ill-appearing or toxic-appearing.   HENT:      Head: Normocephalic.      Right Ear: External ear normal.      Left Ear: External ear normal.      Nose: Nose normal. No congestion.      Mouth/Throat:      Mouth: Mucous membranes are moist.      Pharynx: No oropharyngeal exudate or posterior oropharyngeal erythema.   Eyes:      General:         Right eye: No discharge.         Left eye: No discharge.      Extraocular Movements: Extraocular movements intact.      Conjunctiva/sclera: Conjunctivae normal.      Pupils: Pupils are equal, round, and reactive to light.   Cardiovascular:      Rate and Rhythm: Normal rate and regular rhythm.      Pulses: Normal pulses.      Heart sounds: Normal heart sounds.   Pulmonary:      Effort: Pulmonary effort is normal.      Breath sounds: Normal breath sounds.   Abdominal:      General: Abdomen is flat. There is no distension.      Tenderness: There is no abdominal tenderness.   Musculoskeletal:         General: Normal range of motion.      Cervical back: Normal range of motion and neck supple. No rigidity.   Lymphadenopathy:      Cervical: No cervical adenopathy.   Skin:     General: Skin is warm and dry.      Comments: Slightly raised purpuric rash present to entire body sparing face, hands and feet.  Rash is nonblanchable.    Neurological:      General: No focal deficit present.      Mental Status: He is alert and oriented to person, place, and time. Mental status is at baseline.   Psychiatric:         Mood and Affect: Mood normal.         Behavior: Behavior normal.         Judgment: Judgment normal.         Assessment/Plan:   Assessment      AVS handout given and reviewed with " patient. Pt educated on red flags and when to seek treatment back in ER or UC.     1. Vasculitis (HCC)  - Sed Rate; Future  - CRP QUANTITIVE (NON-CARDIAC); Future  - D-DIMER; Future

## 2023-06-10 DIAGNOSIS — I77.6 VASCULITIS (HCC): ICD-10-CM

## 2023-06-10 LAB
C TRACH DNA SPEC QL NAA+PROBE: NEGATIVE
N GONORRHOEA DNA SPEC QL NAA+PROBE: NEGATIVE
SPECIMEN SOURCE: NORMAL

## 2023-06-12 LAB — NUCLEAR IGG SER QL IA: NORMAL

## 2023-06-15 ENCOUNTER — OFFICE VISIT (OUTPATIENT)
Dept: VASCULAR LAB | Facility: MEDICAL CENTER | Age: 33
End: 2023-06-15
Attending: FAMILY MEDICINE

## 2023-06-15 VITALS
HEIGHT: 70 IN | BODY MASS INDEX: 34.65 KG/M2 | SYSTOLIC BLOOD PRESSURE: 122 MMHG | HEART RATE: 76 BPM | WEIGHT: 242 LBS | DIASTOLIC BLOOD PRESSURE: 78 MMHG

## 2023-06-15 DIAGNOSIS — M02.39 REACTIVE ARTHRITIS OF MULTIPLE SITES (HCC): ICD-10-CM

## 2023-06-15 DIAGNOSIS — R23.3 PETECHIAE: ICD-10-CM

## 2023-06-15 DIAGNOSIS — I77.6 VASCULITIS (HCC): ICD-10-CM

## 2023-06-15 DIAGNOSIS — J02.0 STREP THROAT: ICD-10-CM

## 2023-06-15 PROCEDURE — 99212 OFFICE O/P EST SF 10 MIN: CPT

## 2023-06-15 PROCEDURE — 3078F DIAST BP <80 MM HG: CPT | Performed by: FAMILY MEDICINE

## 2023-06-15 PROCEDURE — 3074F SYST BP LT 130 MM HG: CPT | Performed by: FAMILY MEDICINE

## 2023-06-15 PROCEDURE — 99204 OFFICE O/P NEW MOD 45 MIN: CPT | Performed by: FAMILY MEDICINE

## 2023-06-15 RX ORDER — CEPHALEXIN 500 MG/1
500 CAPSULE ORAL 3 TIMES DAILY
Qty: 21 CAPSULE | Refills: 0 | Status: SHIPPED | OUTPATIENT
Start: 2023-06-15 | End: 2023-06-22

## 2023-06-15 RX ORDER — METHYLPREDNISOLONE 4 MG/1
TABLET ORAL
COMMUNITY
Start: 2023-06-05

## 2023-06-15 ASSESSMENT — ENCOUNTER SYMPTOMS
WHEEZING: 0
SHORTNESS OF BREATH: 0
HEMOPTYSIS: 0
POLYDIPSIA: 0
ORTHOPNEA: 0
CLAUDICATION: 0
VOMITING: 0
HEADACHES: 0
COUGH: 0
CHILLS: 0
PALPITATIONS: 0
MYALGIAS: 0
ABDOMINAL PAIN: 0
PND: 0
DIZZINESS: 0
SPUTUM PRODUCTION: 0
BRUISES/BLEEDS EASILY: 1
NAUSEA: 0
BLOOD IN STOOL: 0
FEVER: 0
FLANK PAIN: 0

## 2023-06-15 NOTE — PROGRESS NOTES
INITIAL VASCULAR MEDICINE VISIT  06/15/23     Nick Kunz is a 32 y.o. male  who presents for   Chief Complaint   Patient presents with    Other     NP Dx: Vasculitis     initially referred by Olamide Us A.P.R.* for eval and med mgmt of vasculitis, est 6/2023     Subjective         Possible vasculitis:  Inivital visit hx: seen at  6/8/23 with fever, ST, diffuse rash with L hand swelling and pain.  Had prior been to [facility] ED with normal labs, dx with vasculitis and tx with Medrol but did not improve. 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  No buttock or new chest lesions.  Denies foamy urine.  Reports home tested for COVID negative.  But did not have any strep testing.    No prior post-strep vasc/GM.  No baseline rheum d/o.  Denies overt bleeding or bruising other than petechiae currently, non-pruritic.    Denies other baseline medical conditions in the past   Labs show mild high CRP.  No CBC or renal function testing to date.    Taking prednisone taper and reports some improvement.  Had prior medrol and currently on day 5 of 10 days taper.    No other new f, ST, or other sx.     History reviewed. No pertinent past medical history.  Past Surgical History:   Procedure Laterality Date    OTHER ORTHOPEDIC SURGERY          Current Outpatient Medications:     methylPREDNISolone, TAKE BY MOUTH AS DIRECTED ON INSIDE OF PACKAGE, Taking    cephALEXin, 500 mg, Oral, TID    predniSONE, 60 mg (3 tablets) for one day 40 mg (2 tablets) for 3 days, 30 mg (1.5 tablets) 3 days, 20 mg (1 tablet) for 3 days and 10 mg (0.5 tablets) for 3 days, Taking   No Known Allergies  History reviewed. No pertinent family history.   Social History     Tobacco Use    Smoking status: Never    Smokeless tobacco: Never   Vaping Use    Vaping Use: Never used   Substance Use Topics    Alcohol use: No    Drug use:  "No     Review of Systems   Constitutional:  Negative for chills, fever and malaise/fatigue.   Respiratory:  Negative for cough, hemoptysis, sputum production, shortness of breath and wheezing.    Cardiovascular:  Negative for chest pain, palpitations, orthopnea, claudication, leg swelling and PND.   Gastrointestinal:  Negative for abdominal pain, blood in stool, melena, nausea and vomiting.   Genitourinary:  Negative for dysuria, flank pain and hematuria.   Musculoskeletal:  Positive for joint pain. Negative for myalgias.   Neurological:  Negative for dizziness and headaches.   Endo/Heme/Allergies:  Negative for polydipsia. Bruises/bleeds easily.           Objective   Vitals:    06/15/23 0930   BP: 122/78   BP Location: Left arm   Patient Position: Sitting   BP Cuff Size: Large adult   Pulse: 76   Weight: 110 kg (242 lb)   Height: 1.778 m (5' 10\")      BP Readings from Last 5 Encounters:   06/15/23 122/78   06/08/23 (!) 140/88   02/23/22 124/70   02/17/22 112/64   02/12/22 116/70      Body mass index is 34.72 kg/m².  Wt Readings from Last 3 Encounters:   06/15/23 110 kg (242 lb)   06/08/23 111 kg (243 lb 11.2 oz)   02/23/22 110 kg (243 lb)      Physical Exam  Vitals reviewed.   Constitutional:       General: He is not in acute distress.     Appearance: He is well-developed. He is not diaphoretic.   HENT:      Head: Normocephalic and atraumatic.      Mouth/Throat:      Lips: No lesions.      Mouth: Mucous membranes are moist.      Dentition: Abnormal dentition. No gum lesions.      Pharynx: Uvula midline. No pharyngeal swelling, oropharyngeal exudate or posterior oropharyngeal erythema.      Tonsils: No tonsillar exudate. 1+ on the right. 1+ on the left.   Neck:      Thyroid: No thyromegaly.      Vascular: No JVD.   Cardiovascular:      Rate and Rhythm: Normal rate and regular rhythm.      Pulses: Normal pulses.      Heart sounds: No murmur heard.  Pulmonary:      Effort: No respiratory distress.      Breath sounds: " Normal breath sounds. No wheezing or rales.   Musculoskeletal:         General: No swelling or tenderness. Normal range of motion.      Comments: No erythema or warm joints, FROM, normal gait   Skin:     Coloration: Skin is not cyanotic, jaundiced or pale.      Findings: Petechiae (scattered with mild purpura at bilat posterior arms, scattered and clustered at distal LEs, new purpura at medial L calf) present.   Neurological:      General: No focal deficit present.      Mental Status: He is oriented to person, place, and time.      Cranial Nerves: No cranial nerve deficit.      Coordination: Coordination normal.   Psychiatric:         Mood and Affect: Mood normal.         Behavior: Behavior normal.           No results found for: LIPOPROTA   No results found for: APOB                        No results found for: MICROALBCALC, MALBCRT, MALBEXCR, [id number], MICROALBUR, MICRALB, UMICROALBUM, MICROALBTIM      Latest Reference Range & Units Most Recent   Rheumatoid Factor -Neph- 0 - 14 IU/mL 10  6/9/23 08:43   Stat C-Reactive Protein 0.00 - 0.75 mg/dL 2.55 (H)  6/9/23 08:43   Antinuclear Antibody None Detected  None Detected  6/9/23 08:43      Latest Reference Range & Units Most Recent   Sed Rate Westergren 0 - 20 mm/hour 18  6/9/23 08:43           VASCULAR IMAGING:   No results found for this or any previous visit.         Medical Decision Making:  Today's Assessment / Status / Plan:     1. Vasculitis (HCC)  ANTISTREPTOLYSIN O    CRP HIGH SENSITIVE (CARDIAC)    Sed Rate    Comp Metabolic Panel    MICROALBUMIN CREAT RATIO URINE    CBC WITH DIFFERENTIAL    SPEP W/REFLEX TO FRANKLYN, A, G, M    cephALEXin (KEFLEX) 500 MG Cap    URINALYSIS    MPO AND PR3 WITH REFLEX TO ANCA    possibly post-strep      2. 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 Petechiae            Patient Type: Primary Prevention    1) petechiae with reactive arthritis - presumed post-strep based upon ST hx and natural hx timeline  Non-urticarial, afebrile, non-pruritic petechiae  No edema, HTN, urinary issues   Negative rheum w/u and mild CRP elevation only, nl ESR   Low prob cyroglob due to lack of worsening in cold temp   - Will update labs including ASO titer, inflam markers and assessment of plats and renal function due to risk for post-strep GM.   - check SPEP for hyperIgA, ANCA ab panel   - consider HCV and complement testing   - no indications for skin bx at this time   - continue current pred taper, will consider extension based upon labs, improvements of s/s   - consider colchicine  - reassurance this should improve with time over next 1-6 weeks including resolution of petechiae  -will monitor for acute bleeding and/or new acute kidney injury or severe thrombocytopenia   - empiric Keflex x 7d in case of ongoing colonization of strep     BLOOD PRESSURE CONTROL   ACC/AHA (2017) goal <130/80  Home BP at goal: yes  Office BP at goal:  yes   Plan:   - continue healthy diet, activity, weight mgmt   Monitoring:   - routine clinic-based BP measurements at least once annually   Medications: no meds indicated at this time      LIPID MANAGEMENT:   Qualifies for Statin Therapy Based on 2018 ACC/AHA Guidelines: no  The ASCVD Risk score (Aaliyah DK, et al., 2019) failed to calculate.  Major ASCVD events: None  High-risk conditions: None   Risk-enhancers: N/A  Currently on Statin: No  Tx goals: LDL-C <100 (consider non-HDL-C <130, apoB <90)  At goal? N/A  Plan:   - reinforced ongoing TLC measures as noted   - defer lab surveillance to PCP   Meds: none at this time      ANTITHROMBOTIC/ANTIPLATELET THERAPY: none     GLYCEMIC STATUS: Normal    LIFESTYLE INTERVENTIONS:    SMOKING:     reports that he has never smoked. He has  never used smokeless tobacco.   - continued complete avoidance of all tobacco products     PHYSICAL ACTIVITY: continue healthy activity to improve CV fitness.  In general, targeting >150min/week of moderate-level activity or as much as tolerated in light of functional status and co-morbidities     WEIGHT MANAGEMENT AND NUTRITION: Mediterranean style dietary approach      OTHER:     # reactive arthralgia/arthritis - improving slightly since onset   RICHIE, rheum factor negative   -continue prednisone, monitor inflam markers       Instructed to follow-up with PCP for remainder of adult medical needs: yes  We will partner with other providers in the management of established vascular disease and cardiometabolic risk factors.    Studies to Be Obtained: none    Labs to Be Obtained: as noted above     Follow up in: 1 weeks, sooner ir worsening, ED for any acute bleeding complications     José Miguel Treviño M.D.  [facility]   [facility]   [phone]

## 2023-06-26 ENCOUNTER — APPOINTMENT (OUTPATIENT)
Dept: VASCULAR LAB | Facility: MEDICAL CENTER | Age: 33
End: 2023-06-26
Attending: FAMILY MEDICINE
